# Patient Record
Sex: MALE | Race: BLACK OR AFRICAN AMERICAN | NOT HISPANIC OR LATINO | Employment: FULL TIME | URBAN - METROPOLITAN AREA
[De-identification: names, ages, dates, MRNs, and addresses within clinical notes are randomized per-mention and may not be internally consistent; named-entity substitution may affect disease eponyms.]

---

## 2019-11-25 ENCOUNTER — OFFICE VISIT (OUTPATIENT)
Dept: URGENT CARE | Facility: CLINIC | Age: 39
End: 2019-11-25
Payer: COMMERCIAL

## 2019-11-25 ENCOUNTER — APPOINTMENT (OUTPATIENT)
Dept: RADIOLOGY | Facility: CLINIC | Age: 39
End: 2019-11-25
Payer: COMMERCIAL

## 2019-11-25 VITALS
OXYGEN SATURATION: 97 % | RESPIRATION RATE: 18 BRPM | HEIGHT: 72 IN | SYSTOLIC BLOOD PRESSURE: 164 MMHG | WEIGHT: 259 LBS | HEART RATE: 90 BPM | TEMPERATURE: 97.3 F | BODY MASS INDEX: 35.08 KG/M2 | DIASTOLIC BLOOD PRESSURE: 102 MMHG

## 2019-11-25 DIAGNOSIS — M25.532 WRIST PAIN, ACUTE, LEFT: Primary | ICD-10-CM

## 2019-11-25 DIAGNOSIS — M25.532 WRIST PAIN, ACUTE, LEFT: ICD-10-CM

## 2019-11-25 PROCEDURE — 73110 X-RAY EXAM OF WRIST: CPT

## 2019-11-25 PROCEDURE — 99213 OFFICE O/P EST LOW 20 MIN: CPT | Performed by: NURSE PRACTITIONER

## 2019-11-25 NOTE — PATIENT INSTRUCTIONS
Wrist Sprain   WHAT YOU NEED TO KNOW:   A wrist sprain happens when one or more ligaments in your wrist stretch or tear  Ligaments are tough tissues that connect bones and keep them in place, and support your joints  DISCHARGE INSTRUCTIONS:   Return to the emergency department if:   · You have severe pain or swelling  · Your injured wrist is red or has red streaks spreading from the injured area  · You have new trouble moving your hands, fingers, or wrist     · Your wrist, hand, or fingers feel cold or numb  · Your fingernails turn blue or gray  Contact your healthcare provider if:   · Your symptoms get worse  · You have pain and swelling for more than 48 hours  · You have questions or concerns about your condition or care  Medicines:   · NSAIDs , such as ibuprofen, help decrease swelling, pain, and fever  NSAIDs can cause stomach bleeding or kidney problems in certain people  If you take blood thinner medicine, always ask your healthcare provider if NSAIDs are safe for you  Always read the medicine label and follow directions  · Acetaminophen  decreases pain and fever  It is available without a doctor's order  Ask how much to take and how often to take it  Follow directions  Read the labels of all other medicines you are using to see if they also contain acetaminophen, or ask your doctor or pharmacist  Acetaminophen can cause liver damage if not taken correctly  Do not use more than 4 grams (4,000 milligrams) total of acetaminophen in one day  · Take your medicine as directed  Contact your healthcare provider if you think your medicine is not helping or if you have side effects  Tell him or her if you are allergic to any medicine  Keep a list of the medicines, vitamins, and herbs you take  Include the amounts, and when and why you take them  Bring the list or the pill bottles to follow-up visits  Carry your medicine list with you in case of an emergency    Self-care:   · Rest  your wrist for at least 48 hours  Avoid activities that cause pain  · Ice  your wrist for 15 to 20 minutes every hour or as directed  Use an ice pack, or put crushed ice in a plastic bag  Cover it with a towel before you put it on your wrist  Ice helps prevent tissue damage and decreases swelling and pain  · Compress  your wrist with an elastic bandage  This will help decrease swelling, support your wrist, and help it heal  Wear your wrist wrap as directed  The elastic bandage should be snug but not tight  · Elevate  your wrist above the level of your heart as often as you can  This will help decrease swelling and pain  Prop your wrist on pillows or blankets to keep it elevated comfortably  Wrist support: You may need to wear a splint or cast to support your wrist and prevent more damage  Wear your splint as directed  Ask for instructions on how to bathe while you are wearing a splint or cast    Physical therapy:  Your healthcare provider may recommend that you go to physical therapy  A physical therapist teaches you exercises to help improve movement and strength, and to decrease pain  Follow up with your healthcare provider as directed:  Write down your questions so you remember to ask them during your visits  © 2017 [employer]  Information is for End User's use only and may not be sold, redistributed or otherwise used for commercial purposes  All illustrations and images included in CareNotes® are the copyrighted property of [employer]  or [employer]  The above information is an  only  It is not intended as medical advice for individual conditions or treatments  Talk to your doctor, nurse or pharmacist before following any medical regimen to see if it is safe and effective for you

## 2019-11-25 NOTE — PROGRESS NOTES
[facility]        NAME: Juanita Vega is a 37 y o  male  : 1980    MRN: [id number]  DATE: 2019  TIME: 9:57 AM    Assessment and Plan   Wrist pain, acute, left [M25 532]  1  Wrist pain, acute, left  XR wrist 3+ vw left    Brace         Patient Instructions     Tylenol 1000 mg TID prn OTC  Wrist brace given to patient  appt made for him to f/u with PCP at [address] [place] [address] on [facility] in [facility]  Follow up with PCP in 3-5 days  Proceed to  ER if symptoms worsen  Chief Complaint     Chief Complaint   Patient presents with    Wrist Pain     left   started 19  Pulling hose and felt pain in wrist          History of Present Illness       HPI   Reports to the clinic with c/o of left wrist pain, x 1 week  Says he was working with some hoses at work, when it started  He felt some sharp pain and the pain has persisted since  Moderate severity, sharp, no radiation  Denies pervious problems or injuries to the affected wrist  Took advil when it first occurred but nothing since then  Worse with certain movements of the left hand and with hard grasping  BP is elevated  Has been told in past that his BP was elevated  Advised to f/u with PCP  Review of Systems   Review of Systems   Constitutional: Negative for chills and fever  Musculoskeletal: Positive for myalgias (left wrist, as above)  Negative for arthralgias and joint swelling  Skin: Negative for rash and wound  Neurological: Negative for weakness and numbness  Current Medications     No current outpatient medications on file  Current Allergies     Allergies as of 2019    (No Known Allergies)            The following portions of the patient's history were reviewed and updated as appropriate: allergies, current medications, past family history, past medical history, past social history, past surgical history and problem list      History reviewed  No pertinent past medical history      History reviewed  No pertinent surgical history  History reviewed  No pertinent family history  Medications have been verified  Objective   BP (!) 164/102   Pulse 90   Temp (!) 97 3 °F (36 3 °C)   Resp 18   Ht 6' (1 829 m)   Wt 117 kg (259 lb)   SpO2 97%   BMI 35 13 kg/m²        Physical Exam     Physical Exam   Musculoskeletal: He exhibits tenderness (Palpation of the base of the left thumb elicited tenderness, and also with palpation of the medial aspect of the left wrist  Full ROM in all fields but with some pain reported  Movement of fingers WNL  Cap refill < 2 secs   5/5 )  He exhibits no edema     No swelling or bruising or erythema

## 2019-11-25 NOTE — LETTER
November 25, 2019     Patient: Mary Deleon   YOB: 1980   Date of Visit: 11/25/2019       To Whom it May Concern:    Mary Deleon was seen in my clinic on 11/25/2019  He may return to work on 11/27/2019  If you have any questions or concerns, please don't hesitate to call           Sincerely,          DIXON Champion        CC: No Recipients

## 2020-01-06 PROBLEM — I10 ESSENTIAL HYPERTENSION: Status: ACTIVE | Noted: 2020-01-06

## 2020-01-06 PROBLEM — E66.9 OBESITY (BMI 30-39.9): Status: ACTIVE | Noted: 2020-01-06

## 2020-05-13 ENCOUNTER — NURSE TRIAGE (OUTPATIENT)
Dept: OTHER | Facility: OTHER | Age: 40
End: 2020-05-13

## 2020-06-09 ENCOUNTER — NURSE TRIAGE (OUTPATIENT)
Dept: OTHER | Facility: OTHER | Age: 40
End: 2020-06-09

## 2020-06-09 DIAGNOSIS — Z20.822 EXPOSURE TO COVID-19 VIRUS: ICD-10-CM

## 2020-06-09 DIAGNOSIS — Z20.822 EXPOSURE TO COVID-19 VIRUS: Primary | ICD-10-CM

## 2020-06-09 PROCEDURE — U0003 INFECTIOUS AGENT DETECTION BY NUCLEIC ACID (DNA OR RNA); SEVERE ACUTE RESPIRATORY SYNDROME CORONAVIRUS 2 (SARS-COV-2) (CORONAVIRUS DISEASE [COVID-19]), AMPLIFIED PROBE TECHNIQUE, MAKING USE OF HIGH THROUGHPUT TECHNOLOGIES AS DESCRIBED BY CMS-2020-01-R: HCPCS

## 2020-06-10 LAB — SARS-COV-2 RNA SPEC QL NAA+PROBE: NOT DETECTED

## 2020-06-11 ENCOUNTER — TELEPHONE (OUTPATIENT)
Dept: OTHER | Facility: OTHER | Age: 40
End: 2020-06-11

## 2021-07-23 ENCOUNTER — OFFICE VISIT (OUTPATIENT)
Dept: FAMILY MEDICINE CLINIC | Facility: CLINIC | Age: 41
End: 2021-07-23

## 2021-07-23 VITALS
HEIGHT: 72 IN | HEART RATE: 89 BPM | BODY MASS INDEX: 33.86 KG/M2 | OXYGEN SATURATION: 98 % | RESPIRATION RATE: 16 BRPM | WEIGHT: 250 LBS | SYSTOLIC BLOOD PRESSURE: 178 MMHG | TEMPERATURE: 98 F | DIASTOLIC BLOOD PRESSURE: 112 MMHG

## 2021-07-23 DIAGNOSIS — I10 ESSENTIAL HYPERTENSION: Primary | ICD-10-CM

## 2021-07-23 PROCEDURE — 99213 OFFICE O/P EST LOW 20 MIN: CPT | Performed by: FAMILY MEDICINE

## 2021-07-23 PROCEDURE — 3725F SCREEN DEPRESSION PERFORMED: CPT | Performed by: FAMILY MEDICINE

## 2021-07-23 PROCEDURE — 3008F BODY MASS INDEX DOCD: CPT | Performed by: FAMILY MEDICINE

## 2021-07-23 RX ORDER — HYDROCHLOROTHIAZIDE 25 MG/1
25 TABLET ORAL DAILY
Qty: 30 TABLET | Refills: 0 | Status: SHIPPED | OUTPATIENT
Start: 2021-07-23

## 2021-07-23 NOTE — PROGRESS NOTES
Assessment/Plan:    Essential hypertension  Blood Pressure 178/112 mmHg,  Suboptimally controlled  Goal BP less than 140/90 mmHg as per JNC-8  Reviewed BP target goal with patient  Currently on no treatment  No EKG on records     -Continue to maintain healthy balanced diet with focus on low salt intake    -Limit alcohol intake,  and avoid alcohol or driving                - Will start the patient on hydrochlorothiazide 25 mg daily, the patient was advised to measure blood pressure home her low results and bring them at the next office visit  - consider a baseline EKG for record  - As per chart, patient has an upcoming appointment at our office next month  -  ED precautions given to the patient especially if he develops chest pain palpitation, shortness of breath,   Headaches,  Nausea/vomiting or dizziness death  Diagnoses and all orders for this visit:    Essential hypertension  -     hydrochlorothiazide (HYDRODIURIL) 25 mg tablet; Take 1 tablet (25 mg total) by mouth daily          Subjective:      Patient ID: Kaitlyn Stovall is a 41 y o  male  Meet Smith is a 41 y o  M with no significant past medical history, who presents to the clinic after he was found to have high blood pressure at his job  He was applying for a new position as a   His new employer requires him to start treatment before he can be accepted his new position  He reports no medical conditions, denies recent hospitalizations or emergency  Overall patient  feels well  and this caught him by surprise since has no symptoms  Denies fatigue, headaches, dizziness, blurred vision, nausea, palpitation, chest pain, SOB, urinary changes, weakness, bowel changes, sleep problems or  Snoring  patient reports that he is an occasional smoker and usually drinks one glass of cognac occasionally  Hypertension  This is a chronic problem  Pertinent negatives include no chest pain, headaches, palpitations or shortness of breath   Risk factors for coronary artery disease include male gender  Past treatments include nothing  There is no history of angina, kidney disease, CAD/MI, CVA, heart failure, left ventricular hypertrophy, PVD or retinopathy  The following portions of the patient's history were reviewed and updated as appropriate: allergies, current medications, past family history, past medical history, past social history, past surgical history and problem list     Review of Systems   Constitutional: Negative for activity change, appetite change, chills, fatigue and fever  HENT: Negative for congestion, postnasal drip, rhinorrhea and sore throat  Eyes: Negative  Negative for visual disturbance  Respiratory: Negative  Negative for cough, chest tightness, shortness of breath and wheezing  Cardiovascular: Negative for chest pain, palpitations and leg swelling  Gastrointestinal: Negative for abdominal distention, abdominal pain, blood in stool, constipation, diarrhea, nausea and vomiting  Genitourinary: Negative for difficulty urinating, dysuria and hematuria  Musculoskeletal: Negative for arthralgias and myalgias  Skin: Negative for rash  Neurological: Negative for dizziness, syncope, weakness, light-headedness, numbness and headaches  Psychiatric/Behavioral: Negative for agitation, behavioral problems, self-injury, sleep disturbance and suicidal ideas  Objective:      BP (!) 178/112 (BP Location: Right arm, Patient Position: Sitting, Cuff Size: Large)   Pulse 89   Temp 98 °F (36 7 °C) (Temporal)   Resp 16   Ht 6' (1 829 m)   Wt 113 kg (250 lb)   SpO2 98%   BMI 33 91 kg/m²          Physical Exam  Vitals (High blood pressure, symptomatic) and nursing note reviewed  Constitutional:       General: He is not in acute distress  Appearance: Normal appearance  He is well-developed  He is obese  He is not ill-appearing, toxic-appearing or diaphoretic  HENT:      Head: Normocephalic and atraumatic  Cardiovascular:      Rate and Rhythm: Normal rate and regular rhythm  No extrasystoles are present  Pulses:           Radial pulses are 2+ on the right side and 2+ on the left side  Heart sounds: Normal heart sounds, S1 normal and S2 normal  No murmur heard  No friction rub  No gallop  Pulmonary:      Effort: Pulmonary effort is normal  No respiratory distress  Breath sounds: Normal breath sounds  No stridor  No wheezing, rhonchi or rales  Chest:      Chest wall: No tenderness  Abdominal:      General: Bowel sounds are normal       Palpations: Abdomen is soft  Tenderness: There is no abdominal tenderness  Musculoskeletal:         General: No deformity  Normal range of motion  Cervical back: Normal range of motion and neck supple  Right lower leg: No edema  Left lower leg: No edema  Feet:      Right foot:      Skin integrity: No ulcer, skin breakdown, erythema, warmth, callus or dry skin  Left foot:      Skin integrity: No ulcer, skin breakdown, erythema, warmth, callus or dry skin  Skin:     Findings: No rash  Neurological:      Mental Status: He is alert and oriented to person, place, and time

## 2021-07-23 NOTE — ASSESSMENT & PLAN NOTE
Blood Pressure 178/112 mmHg,  Suboptimally controlled  Goal BP less than 140/90 mmHg as per JNC-8  Reviewed BP target goal with patient  Currently on no treatment  No EKG on records     -Continue to maintain healthy balanced diet with focus on low salt intake    -Limit alcohol intake,  and avoid alcohol or driving                - Will start the patient on hydrochlorothiazide 25 mg daily, the patient was advised to measure blood pressure home her low results and bring them at the next office visit  - consider a baseline EKG for record  - As per chart, patient has an upcoming appointment at our office next month  -  ED precautions given to the patient especially if he develops chest pain palpitation, shortness of breath,   Headaches,  Nausea/vomiting or dizziness death